# Patient Record
Sex: FEMALE | Race: WHITE | HISPANIC OR LATINO
[De-identification: names, ages, dates, MRNs, and addresses within clinical notes are randomized per-mention and may not be internally consistent; named-entity substitution may affect disease eponyms.]

---

## 2023-05-08 ENCOUNTER — NON-APPOINTMENT (OUTPATIENT)
Age: 53
End: 2023-05-08

## 2023-05-08 ENCOUNTER — APPOINTMENT (OUTPATIENT)
Dept: ORTHOPEDIC SURGERY | Facility: CLINIC | Age: 53
End: 2023-05-08
Payer: COMMERCIAL

## 2023-05-08 DIAGNOSIS — S46.002A UNSPECIFIED INJURY OF MUSCLE(S) AND TENDON(S) OF THE ROTATOR CUFF OF LEFT SHOULDER, INITIAL ENCOUNTER: ICD-10-CM

## 2023-05-08 DIAGNOSIS — S46.001A UNSPECIFIED INJURY OF MUSCLE(S) AND TENDON(S) OF THE ROTATOR CUFF OF RIGHT SHOULDER, INITIAL ENCOUNTER: ICD-10-CM

## 2023-05-08 PROBLEM — Z00.00 ENCOUNTER FOR PREVENTIVE HEALTH EXAMINATION: Status: ACTIVE | Noted: 2023-05-08

## 2023-05-08 PROCEDURE — 99203 OFFICE O/P NEW LOW 30 MIN: CPT

## 2023-05-08 NOTE — PHYSICAL EXAM
[de-identified] : PHYSICAL EXAM LEFT AND RIGHT  SHOULDER\par \par \par SPURLING NEGATIVE BILATERALLY \par \par NORMAL POSTURE\par AROM \par LEFT   150 / 150 / 90 / 20 \par RIGHT 150 / 150 / 90 /    30 \par TENDER: SA REGION LATERAL  \par \par SPECIAL TESTING :\par HUFFMAN - POSITIVE \par ADONAY - POSITIVE \par SPEED TEST - POSITIVE\par \par KEENAN - NEGATIVE \par APPREHENSION AND SUPPRESSION - NEGATIVE \par \par RC STRENGTH TESTING \par SS:  4/5\par SUB 5/5\par IS     5/5\par BICEPS  5/5\par \par SENSATION  - GROSSLY INTACT\par \par \par

## 2023-05-08 NOTE — DISCUSSION/SUMMARY
[de-identified] : PATIENT PRESENTS TODAY REPORTING HISTORY OF BILATERAL SHOULDER PAIN SINCE 2019.  CURRENTLY LEFT IS GREATER THAN RIGHT SOMETIMES RIGHT IS WORSE.\par \par PAIN IN THE LATERAL AND ANTERIOR SHOULDERS OCCASIONALLY RADIATES DOWN THE ARM. SPURLING TEST NEGATIVE BILATERALLY. SHE  HAS HAD A SERIES OF AT LEAST 2 CORTISONE INJECTIONS EACH SHOULDER HAS HAD 15-20 VISITS OF PHYSICAL THERAPY BUT HAS HAD NO RELIEF THAT HAS LASTED BEYOND THE FIRST FEW MONTHS AFTER THE INJECTION.\par \par X-RAYS REPORTEDLY HAVE BEEN UNREMARKABLE IN THE PAST\par \par PATIENT HAS FAILED CONSERVATIVE MANAGEMENT.  REPORTS THAT MULTIPLE CORTISONE INJECTIONS WERE NOT HELPFUL REPORTS OF 1520 VISITS OF PHYSICAL THERAPY HAVE NOT BEEN HELPFUL.\par \par I REQUEST AUTHORIZATION FOR MRI RIGHT AND LEFT SHOULDER TO EVALUATE THE INTEGRITY OF THE ROTATOR CUFF AND LABRUM.\par \par PATIENT RETURN AFTER MRI TO REVIEW THE IMAGES AND DISCUSS TREATMENT OPTIONS

## 2023-11-02 ENCOUNTER — APPOINTMENT (OUTPATIENT)
Dept: ORTHOPEDIC SURGERY | Facility: CLINIC | Age: 53
End: 2023-11-02
Payer: COMMERCIAL

## 2023-11-02 PROCEDURE — 99213 OFFICE O/P EST LOW 20 MIN: CPT | Mod: 95

## 2023-11-16 ENCOUNTER — APPOINTMENT (OUTPATIENT)
Dept: ORTHOPEDIC SURGERY | Facility: CLINIC | Age: 53
End: 2023-11-16

## 2024-02-29 ENCOUNTER — APPOINTMENT (OUTPATIENT)
Dept: ORTHOPEDIC SURGERY | Facility: CLINIC | Age: 54
End: 2024-02-29
Payer: COMMERCIAL

## 2024-02-29 PROCEDURE — 99213 OFFICE O/P EST LOW 20 MIN: CPT

## 2024-02-29 NOTE — PHYSICAL EXAM
[de-identified] : PHYSICAL EXAM LEFT AND RIGHT  SHOULDER - PREVIOUS VISIT   SPURLING NEGATIVE BILATERALLY   NORMAL POSTURE AROM  LEFT   150 / 150 / 90 / 20  RIGHT 150 / 150 / 90 /    30  TENDER: SA REGION LATERAL    SPECIAL TESTING : HUFFMAN - POSITIVE  ADONAY - POSITIVE  SPEED TEST - POSITIVE  KEENAN - NEGATIVE  APPREHENSION AND SUPPRESSION - NEGATIVE   RC STRENGTH TESTING  SS:  4/5 SUB 5/5 IS     5/5 BICEPS  5/5  SENSATION  - GROSSLY INTACT

## 2024-02-29 NOTE — HISTORY OF PRESENT ILLNESS
[de-identified] : BILATERAL SHOULDER PAIN  FOLLOW UP  PRP     BILATERAL SHOULDER PAIN  [facility] FOLLOW UP  MRI RESULTS TODAY  PATIENT REPORTS CONTINUING CONSTANT BILATERAL SHOULDER PAIN 10/10 WITHOUT RELIEF.  I ASKED SPECIFICALLY WHICH SHOULDER IS WORSE AND PATIENT REPLIED BOTH.  PAIN IS SHARP ACHING AND LATERAL ARM AND SHOULDERS OCCASIONALLY RADIATES UP NECK AND DOWN THE ARM HAS HAD NO SUBSEQUENT INJURIES AND NO SUBSEQUENT TREATMENTS    PREVIOUS HPI LOCATION:BILATERAL SHOULDER PAIN  DURATION:FALL 2019  QUALITY: SHARP, ACHY, THROBBING IN LATERAL ARMS AND SHOULDERS RADIATING PAIN UP NECK AND DOWN ARM  ACUPUNCTURE EVERYDAY   CONSTANT   PAIN LEVEL: 10/10  BETTER WITH ACUPUNCTURE, TYLENOL , P.T  WORSE WITH OVER HEAD LIFTING, REACHING BEHIND, AT NIGHT  ASSOCIATED SYMPTOMS: LIMITED ROM  ASSOCIATED CLICKING/LOCKING/POPPING/GRINDING  PT HAD 4  CORTISONE INJECTIONS IN TOTAL - EACH ONE LASTED 4-5 MONTHS   PATIENT TRIED 15-20  SESSIONS OF  PHYSICAL THERAPY - NO RELIEF  PRIOR STUDIES: XRAYS - UNREMARKABLE   PREVIOUS XRAYS WERE UNREMARKABLE

## 2024-02-29 NOTE — DISCUSSION/SUMMARY
[de-identified] :   MRI RIGHT SHOULDER REVEALS MODERATE AC JOINT ARTHROSIS SMALL SUBACROMIAL SPUR, MILD BURSITIS AND MODERATE TENDINOSIS IN THE ROTATOR CUFF TENDONS. TYPE IIB SLAP TEAR IS MOST LIKELY AGE-RELATED AND NOT TRAUMATIC  MRI LEFT SHOULDER REVEALS MORE ADVANCED AC JOINT ARTHRITIS WITH DISTAL CLAVICLE OSTEOLYSIS. THERE IS A SMALL CALCIUM HYDROXYAPATITE DEPOSIT IN THE SUPRASPINATUS TENDON WITH AN INTRASUBSTANCE PARTIAL TEAR OF THE INFRASPINATUS TENDON. TYPE IIB SLAP TEAR SIMILARLY IS MOST LIKELY AGE-RELATED AND NOT TRAUMATIC  I have explained that left shoulder pathology would require her the scopic treatment. Distal clavicle resection, repair of partial rotator cuff tear and possible treatment for SLAP tear if indicated at the time of surgery  Right shoulder pathology is less significant may respond to a series of 3 PRP injections. PRP is helpful for chronic tendinosis of the tendon without significant tears. I have explained that a single injection may provide up to 70% relief. Second and third injection can provide up to 90% relief  PATIENT WISHES TO PROCEED WITH PRP INJECTION RIGHT AND LEFT SHOULDER. I HAVE EXPLAINED THAT SHE WOULD HAVE TO AVOID BLOOD THINNERS NSAIDS AND ANTI-INFLAMMATORIES FOR 7 DAYS PRIOR TO 14 DAYS AFTER. MY ASSISTANT WILL SEND OUR STANDARD PRP INFORMATION SHEET. PRP IS NOT COVERED BY INSURANCE AND IS PAID FOR BY THE PATIENT.

## 2024-03-25 ENCOUNTER — APPOINTMENT (OUTPATIENT)
Dept: ORTHOPEDIC SURGERY | Facility: CLINIC | Age: 54
End: 2024-03-25
Payer: COMMERCIAL

## 2024-03-25 DIAGNOSIS — M19.012 PRIMARY OSTEOARTHRITIS, LEFT SHOULDER: ICD-10-CM

## 2024-03-25 DIAGNOSIS — M67.813 OTHER SPECIFIED DISORDERS OF TENDON, RIGHT SHOULDER: ICD-10-CM

## 2024-03-25 PROCEDURE — 0232T NJX PLATELET PLASMA: CPT

## 2024-03-25 PROCEDURE — 99213 OFFICE O/P EST LOW 20 MIN: CPT

## 2024-03-25 NOTE — HISTORY OF PRESENT ILLNESS
[de-identified] : BILATERAL SHOULDER PAIN  FOLLOW UP  PRP TODAY- RIGHT SHOULDER     BILATERAL SHOULDER PAIN  [facility] FOLLOW UP  MRI RESULTS TODAY  PATIENT REPORTS CONTINUING CONSTANT BILATERAL SHOULDER PAIN 10/10 WITHOUT RELIEF.  I ASKED SPECIFICALLY WHICH SHOULDER IS WORSE AND PATIENT REPLIED BOTH.  PAIN IS SHARP ACHING AND LATERAL ARM AND SHOULDERS OCCASIONALLY RADIATES UP NECK AND DOWN THE ARM HAS HAD NO SUBSEQUENT INJURIES AND NO SUBSEQUENT TREATMENTS    PREVIOUS HPI LOCATION:BILATERAL SHOULDER PAIN  DURATION:FALL 2019  QUALITY: SHARP, ACHY, THROBBING IN LATERAL ARMS AND SHOULDERS RADIATING PAIN UP NECK AND DOWN ARM  ACUPUNCTURE EVERYDAY   CONSTANT   PAIN LEVEL: 10/10  BETTER WITH ACUPUNCTURE, TYLENOL , P.T  WORSE WITH OVER HEAD LIFTING, REACHING BEHIND, AT NIGHT  ASSOCIATED SYMPTOMS: LIMITED ROM  ASSOCIATED CLICKING/LOCKING/POPPING/GRINDING  PT HAD 4  CORTISONE INJECTIONS IN TOTAL - EACH ONE LASTED 4-5 MONTHS   PATIENT TRIED 15-20  SESSIONS OF  PHYSICAL THERAPY - NO RELIEF  PRIOR STUDIES: XRAYS - UNREMARKABLE   PREVIOUS XRAYS WERE UNREMARKABLE

## 2024-03-25 NOTE — PROCEDURE
[de-identified] : PLATELET RICH PLASMA INJECTION  SHOULDER   90CC BLOOD STERILELY DRAWN FROM FOREARM. 5CC PLATELET RICH PLASMA PREPARED USING [employer] ACP SYSTEM - RECOMMENDED TECHNIQUE   UTILIZING ULTRASOUND GUIDANCE, THE NEEDLE WAS STERILELY PLACED AT SITE OF THE TENDON INJURY AND 5CC PRP INJECTED SLOWLY.   NO COLD PACKS FOR 3 DAYS NO GYM EXERCISES 28 DAYS  AVOID USE OF ORAL NSAIDS FOR 14 DAYS TYLENOL, TRAMADOL, OXY OK

## 2024-03-25 NOTE — DISCUSSION/SUMMARY
[de-identified] : NO COLD PACKS FOR 3 DAYS NO GYM EXERCISES 28 DAYS  AVOID USE OF ORAL NSAIDS FOR 14 DAYS TYLENOL, TRAMADOL, OXY OK

## 2024-03-25 NOTE — PHYSICAL EXAM
[de-identified] : PHYSICAL EXAM LEFT AND RIGHT  SHOULDER - PREVIOUS VISIT   SPURLING NEGATIVE BILATERALLY   NORMAL POSTURE AROM  LEFT   150 / 150 / 90 / 20  RIGHT 150 / 150 / 90 /    30  TENDER: SA REGION LATERAL    SPECIAL TESTING : HUFFMAN - POSITIVE  ADONAY - POSITIVE  SPEED TEST - POSITIVE  KEENAN - NEGATIVE  APPREHENSION AND SUPPRESSION - NEGATIVE   RC STRENGTH TESTING  SS:  4/5 SUB 5/5 IS     5/5 BICEPS  5/5  SENSATION  - GROSSLY INTACT

## 2024-05-30 ENCOUNTER — APPOINTMENT (OUTPATIENT)
Dept: ORTHOPEDIC SURGERY | Facility: CLINIC | Age: 54
End: 2024-05-30
Payer: COMMERCIAL

## 2024-05-30 DIAGNOSIS — M67.813 OTHER SPECIFIED DISORDERS OF TENDON, RIGHT SHOULDER: ICD-10-CM

## 2024-05-30 DIAGNOSIS — M75.112 INCOMPLETE ROTATOR CUFF TEAR OR RUPTURE OF LEFT SHOULDER, NOT SPECIFIED AS TRAUMATIC: ICD-10-CM

## 2024-05-30 PROCEDURE — 004KIT: CUSTOM

## 2024-05-30 PROCEDURE — 0232T NJX PLATELET PLASMA: CPT

## 2024-05-30 PROCEDURE — 99213 OFFICE O/P EST LOW 20 MIN: CPT

## 2024-05-30 RX ORDER — TRAMADOL HYDROCHLORIDE 50 MG/1
50 TABLET, COATED ORAL
Qty: 30 | Refills: 0 | Status: ACTIVE | COMMUNITY
Start: 2024-03-25 | End: 1900-01-01

## 2024-05-30 NOTE — PHYSICAL EXAM
[de-identified] : PHYSICAL EXAM LEFT AND RIGHT  SHOULDER - PREVIOUS VISIT   SPURLING NEGATIVE BILATERALLY   NORMAL POSTURE AROM  LEFT   150 / 150 / 90 / 20  RIGHT 150 / 150 / 90 /    30  TENDER: SA REGION LATERAL    SPECIAL TESTING : HUFFMAN - POSITIVE  ADONAY - POSITIVE  SPEED TEST - POSITIVE  KEENAN - NEGATIVE  APPREHENSION AND SUPPRESSION - NEGATIVE   RC STRENGTH TESTING  SS:  4/5 SUB 5/5 IS     5/5 BICEPS  5/5  SENSATION  - GROSSLY INTACT

## 2024-05-30 NOTE — HISTORY OF PRESENT ILLNESS
[de-identified] : BILATERAL SHOULDER PAIN  FOLLOW UP  MARCH 25, 2024- PRP - RIGHT SHOULDER  PRP INJECTION TODAY LEFT SHOULDER      BILATERAL SHOULDER PAIN  [facility] FOLLOW UP  MRI RESULTS TODAY  PATIENT REPORTS CONTINUING CONSTANT BILATERAL SHOULDER PAIN 10/10 WITHOUT RELIEF.  I ASKED SPECIFICALLY WHICH SHOULDER IS WORSE AND PATIENT REPLIED BOTH.  PAIN IS SHARP ACHING AND LATERAL ARM AND SHOULDERS OCCASIONALLY RADIATES UP NECK AND DOWN THE ARM HAS HAD NO SUBSEQUENT INJURIES AND NO SUBSEQUENT TREATMENTS    PREVIOUS HPI LOCATION:BILATERAL SHOULDER PAIN  DURATION:FALL 2019  QUALITY: SHARP, ACHY, THROBBING IN LATERAL ARMS AND SHOULDERS RADIATING PAIN UP NECK AND DOWN ARM  ACUPUNCTURE EVERYDAY   CONSTANT   PAIN LEVEL: 10/10  BETTER WITH ACUPUNCTURE, TYLENOL , P.T  WORSE WITH OVER HEAD LIFTING, REACHING BEHIND, AT NIGHT  ASSOCIATED SYMPTOMS: LIMITED ROM  ASSOCIATED CLICKING/LOCKING/POPPING/GRINDING  PT HAD 4  CORTISONE INJECTIONS IN TOTAL - EACH ONE LASTED 4-5 MONTHS   PATIENT TRIED 15-20  SESSIONS OF  PHYSICAL THERAPY - NO RELIEF  PRIOR STUDIES: XRAYS - UNREMARKABLE   PREVIOUS XRAYS WERE UNREMARKABLE

## 2024-05-30 NOTE — DISCUSSION/SUMMARY
[de-identified] : NO COLD PACKS FOR 3 DAYS NO GYM EXERCISES 28 DAYS  AVOID USE OF ORAL NSAIDS FOR 14 DAYS TYLENOL, TRAMADOL, OXY OK

## 2024-05-30 NOTE — PROCEDURE
[de-identified] : PLATELET RICH PLASMA INJECTION LEFT SHOULDER   90CC BLOOD STERILELY DRAWN FROM FOREARM. 5CC PLATELET RICH PLASMA PREPARED USING [employer] ACP SYSTEM - RECOMMENDED TECHNIQUE   UTILIZING ULTRASOUND GUIDANCE, THE NEEDLE WAS STERILELY PLACED AT SITE OF THE TENDON INJURY SUB ACROMIAL SPACE AND 5CC PRP INJECTED SLOWLY.   NO COLD PACKS FOR 3 DAYS NO GYM EXERCISES 28 DAYS  AVOID USE OF ORAL NSAIDS FOR 14 DAYS TYLENOL, TRAMADOL, OXY OK

## 2024-06-06 ENCOUNTER — APPOINTMENT (OUTPATIENT)
Dept: ORTHOPEDIC SURGERY | Facility: CLINIC | Age: 54
End: 2024-06-06
Payer: COMMERCIAL

## 2024-06-06 DIAGNOSIS — M17.12 UNILATERAL PRIMARY OSTEOARTHRITIS, LEFT KNEE: ICD-10-CM

## 2024-06-06 DIAGNOSIS — M17.0 BILATERAL PRIMARY OSTEOARTHRITIS OF KNEE: ICD-10-CM

## 2024-06-06 PROCEDURE — 99214 OFFICE O/P EST MOD 30 MIN: CPT

## 2024-06-06 PROCEDURE — 73562 X-RAY EXAM OF KNEE 3: CPT | Mod: LT

## 2024-06-06 NOTE — PHYSICAL EXAM
[de-identified] : PHYSICAL EXAM BILATERAL KNEES  TENDERNESS LATERAL PATELLOFEMORAL JOINT AND LATERAL TIBIOFEMORAL JOINT WELL LOCALIZED AROM RIGHT  0- 145 LEFT    0-145  SPECIAL TESTS  PATELLAR GRIND = GRIND  DRAWER  = NEG LACHMAN = NEG MACMURRAY = NEG   MOTOR = GROSSLY INTACT SENSORY = GROSSLY INTACT    [de-identified] : XRAY RIGHT KNEE: 4 views of the knee GRADE 2-3 OD PF AND LATERAL  No obvious fracture or dislocation.  Alignment within normal limits   XRAY LEFT KNEE: 4 views of the knee GRADE 2-3 OD PF AND LATERAL  No obvious fracture or dislocation.  Alignment within normal limits

## 2024-06-06 NOTE — DISCUSSION/SUMMARY
[de-identified] : PATIENT REPORTS PROGRESSIVE AND CONTINUING BILATERAL KNEE PAIN.  PAIN MOSTLY IN THE ANTERIOR KNEE WORSE WHEN GETTING UP FROM A CHAIR OR WALKING LONG DISTANCES ON HARD SURFACES.  PAIN 7/10 LOCALIZED TO THE KNEE OCCASIONALLY RADIATES NO MARIXA LOCKING CATCHING OR GIVING WAY.  NO SPECIFIC INJURY  PATIENT HAS TEMPORARY RELIEF FROM ANTI-INFLAMMATORIES PRESCRIBED FOR PREVIOUS INJURIES I HAVE SUGGESTED THE USE OF VISCOSUPPLEMENTATION  WHICH WAS EXPLAINED.  HANDOUT PROVIDED  X-RAYS REVEAL GRADE 2-3 OSTEOARTHRITIS WORSE IN THE LATERAL PATELLOFEMORAL JOINT AND LATERAL TIBIOFEMORAL JOINT  WE WILL SUBMIT REQUEST FOR AUTHORIZATION FOR MONOVISC VISCOSUPPLEMENTATION.  PATIENT UNDERSTANDS 4 TO 6-WEEK PROCESS FOR AUTHORIZATION WILL RETURN ONCE AUTHORIZATION IS COMPLETE FOR ADMINISTRATION

## 2024-06-06 NOTE — HISTORY OF PRESENT ILLNESS
[de-identified] : LOCATION: BILATERAL KNEE PAIN  DURATION: PAIN STARTED  OVER 1 YEAR AGO  QUALITY: SHOOTING  RADIATING PAIN UP NECK OR DOWN ARM  INTERMITTENT   PAIN LEVEL:7-8/10  TREATMENTS: PATIENT HAS TRIED REST, TYLENOL  AGGRAVATING FACTORS: PAIN WORSENS WITH WALKING, BENDING, STAIRS   PAIN WORSE DURING THE DAY / AT NIGHT / SLEEPING ASSOCIATED CLICKING/LOCKING/POPPING/GRINDING

## 2024-06-21 RX ORDER — HYALURONATE SODIUM, STABILIZED 88 MG/4 ML
88 SYRINGE (ML) INTRAARTICULAR
Qty: 2 | Refills: 0 | Status: ACTIVE | OUTPATIENT
Start: 2024-06-06

## 2024-07-18 ENCOUNTER — APPOINTMENT (OUTPATIENT)
Dept: ORTHOPEDIC SURGERY | Facility: CLINIC | Age: 54
End: 2024-07-18

## 2024-07-18 DIAGNOSIS — M17.0 BILATERAL PRIMARY OSTEOARTHRITIS OF KNEE: ICD-10-CM

## 2024-07-18 PROCEDURE — 20611 DRAIN/INJ JOINT/BURSA W/US: CPT

## 2024-07-18 PROCEDURE — 99214 OFFICE O/P EST MOD 30 MIN: CPT | Mod: 25

## 2024-10-23 ENCOUNTER — APPOINTMENT (OUTPATIENT)
Dept: ORTHOPEDIC SURGERY | Facility: CLINIC | Age: 54
End: 2024-10-23
Payer: COMMERCIAL

## 2024-10-23 DIAGNOSIS — M67.813 OTHER SPECIFIED DISORDERS OF TENDON, RIGHT SHOULDER: ICD-10-CM

## 2024-10-23 PROCEDURE — 99213 OFFICE O/P EST LOW 20 MIN: CPT

## 2024-10-28 ENCOUNTER — APPOINTMENT (OUTPATIENT)
Dept: ORTHOPEDIC SURGERY | Facility: CLINIC | Age: 54
End: 2024-10-28
Payer: COMMERCIAL

## 2024-10-28 DIAGNOSIS — M75.112 INCOMPLETE ROTATOR CUFF TEAR OR RUPTURE OF LEFT SHOULDER, NOT SPECIFIED AS TRAUMATIC: ICD-10-CM

## 2024-10-28 DIAGNOSIS — S46.002A UNSPECIFIED INJURY OF MUSCLE(S) AND TENDON(S) OF THE ROTATOR CUFF OF LEFT SHOULDER, INITIAL ENCOUNTER: ICD-10-CM

## 2024-10-28 PROCEDURE — 004KIT: CUSTOM

## 2024-10-28 PROCEDURE — 99214 OFFICE O/P EST MOD 30 MIN: CPT

## 2024-10-28 PROCEDURE — 0232T NJX PLATELET PLASMA: CPT

## 2024-12-06 RX ORDER — HYALURONATE SODIUM, STABILIZED 88 MG/4 ML
88 SYRINGE (ML) INTRAARTICULAR
Qty: 2 | Refills: 0 | Status: ACTIVE | OUTPATIENT
Start: 2024-12-06

## 2025-07-21 ENCOUNTER — APPOINTMENT (OUTPATIENT)
Dept: ORTHOPEDIC SURGERY | Facility: CLINIC | Age: 55
End: 2025-07-21

## 2025-07-21 ENCOUNTER — NON-APPOINTMENT (OUTPATIENT)
Age: 55
End: 2025-07-21